# Patient Record
Sex: FEMALE | Race: WHITE | NOT HISPANIC OR LATINO | Employment: FULL TIME | URBAN - METROPOLITAN AREA
[De-identification: names, ages, dates, MRNs, and addresses within clinical notes are randomized per-mention and may not be internally consistent; named-entity substitution may affect disease eponyms.]

---

## 2017-06-21 ENCOUNTER — TRANSFERRED RECORDS (OUTPATIENT)
Dept: HEALTH INFORMATION MANAGEMENT | Facility: CLINIC | Age: 47
End: 2017-06-21

## 2017-08-10 ENCOUNTER — TRANSFERRED RECORDS (OUTPATIENT)
Dept: HEALTH INFORMATION MANAGEMENT | Facility: CLINIC | Age: 47
End: 2017-08-10

## 2017-08-17 ENCOUNTER — PRE VISIT (OUTPATIENT)
Dept: ORTHOPEDICS | Facility: CLINIC | Age: 47
End: 2017-08-17

## 2017-08-17 NOTE — TELEPHONE ENCOUNTER
1.  Date/reason for appt: 8/21/17 8AM - Left Knee Pain/Mass  2.  Referring provider: Dr. Kathy Celaya  3.  Call to patient (Yes / No - short description): no, pt is referred  4.  Previous care at / records requested from: [facility] -- Faxed STAT request for records/imaging

## 2017-08-18 NOTE — TELEPHONE ENCOUNTER
Radiology report received from [facility]. Waiting for image to be pushed.   MRI left knee 6/21/17- [facility] report

## 2017-08-18 NOTE — TELEPHONE ENCOUNTER
Spoke to [facility] -- they have a release on file for pt, they will push MRI to [facility].

## 2017-08-20 ASSESSMENT — ENCOUNTER SYMPTOMS
MUSCLE CRAMPS: 0
HALLUCINATIONS: 0
NECK PAIN: 0
WEIGHT GAIN: 0
STIFFNESS: 0
FEVER: 1
POLYPHAGIA: 0
INCREASED ENERGY: 1
BACK PAIN: 0
ALTERED TEMPERATURE REGULATION: 1
MYALGIAS: 1
CHILLS: 0
ARTHRALGIAS: 1
FATIGUE: 1
NIGHT SWEATS: 1
POLYDIPSIA: 0
DECREASED APPETITE: 0
WEIGHT LOSS: 0
MUSCLE WEAKNESS: 0
JOINT SWELLING: 1

## 2017-08-20 ASSESSMENT — KOOS JR
STRAIGHTENING KNEE FULLY: 1
KOOS JR SCORING: 63.78
RISING FROM SITTING: 2
HOW SEVERE IS YOUR KNEE STIFFNESS AFTER FIRST WAKING IN MORNING: 2
TWISING OR PIVOTING ON KNEE: 3
BENDING TO THE FLOOR TO PICK UP OBJECT: 2
GOING UP OR DOWN STAIRS: 4
STANDING UPRIGHT: 2

## 2017-08-21 ENCOUNTER — OFFICE VISIT (OUTPATIENT)
Dept: ORTHOPEDICS | Facility: CLINIC | Age: 47
End: 2017-08-21

## 2017-08-21 VITALS — BODY MASS INDEX: 33.64 KG/M2 | WEIGHT: 209.3 LBS | HEIGHT: 66 IN

## 2017-08-21 DIAGNOSIS — M25.562 PAIN IN BOTH KNEES, UNSPECIFIED CHRONICITY: ICD-10-CM

## 2017-08-21 DIAGNOSIS — M25.561 PAIN IN BOTH KNEES, UNSPECIFIED CHRONICITY: Primary | ICD-10-CM

## 2017-08-21 DIAGNOSIS — D16.9 ENCHONDROMA OF BONE: ICD-10-CM

## 2017-08-21 DIAGNOSIS — M25.561 PAIN IN BOTH KNEES, UNSPECIFIED CHRONICITY: ICD-10-CM

## 2017-08-21 DIAGNOSIS — M87.9 OSTEONECROSIS (H): ICD-10-CM

## 2017-08-21 DIAGNOSIS — M25.562 PAIN IN BOTH KNEES, UNSPECIFIED CHRONICITY: Primary | ICD-10-CM

## 2017-08-21 PROBLEM — E11.9 TYPE 2 DIABETES MELLITUS WITHOUT COMPLICATION (H): Status: ACTIVE | Noted: 2017-01-03

## 2017-08-21 PROBLEM — F41.9 ANXIETY: Status: ACTIVE | Noted: 2017-02-28

## 2017-08-21 PROBLEM — G43.009 MIGRAINE WITHOUT AURA AND WITHOUT STATUS MIGRAINOSUS, NOT INTRACTABLE: Status: ACTIVE | Noted: 2017-02-28

## 2017-08-21 PROBLEM — E66.9 OBESITY (BMI 30-39.9): Status: ACTIVE | Noted: 2017-07-07

## 2017-08-21 PROBLEM — E78.5 HLD (HYPERLIPIDEMIA): Status: ACTIVE | Noted: 2017-02-28

## 2017-08-21 LAB
CARDIOLIPIN ANTIBODY IGG: <1.6 GPL-U/ML (ref 0–19.9)
CARDIOLIPIN ANTIBODY IGM: 1.4 MPL-U/ML (ref 0–19.9)
FACT IX ACT/NOR PPP: 115 % (ref 65–150)
FACT VIII ACT/NOR PPP: 132 % (ref 55–200)
PLG ACT/NOR PPP CHRO: 120 % (ref 80–133)
PROT S FREE AG ACT/NOR PPP IA: 113 % (ref 55–125)

## 2017-08-21 RX ORDER — SUMATRIPTAN 50 MG/1
TABLET, FILM COATED ORAL
COMMUNITY

## 2017-08-21 RX ORDER — TRAMADOL HYDROCHLORIDE 50 MG/1
TABLET ORAL
COMMUNITY

## 2017-08-21 RX ORDER — CYCLOBENZAPRINE HCL 10 MG
TABLET ORAL
COMMUNITY

## 2017-08-21 RX ORDER — HYDROXYZINE HYDROCHLORIDE 25 MG/1
TABLET, FILM COATED ORAL
COMMUNITY

## 2017-08-21 RX ORDER — PROMETHAZINE HYDROCHLORIDE 25 MG/1
TABLET ORAL
COMMUNITY

## 2017-08-21 RX ORDER — ROSUVASTATIN CALCIUM 20 MG/1
TABLET, COATED ORAL
COMMUNITY
Start: 2017-01-03

## 2017-08-21 NOTE — PROGRESS NOTES
[facility], Orthopaedic Oncology Surgery Consultation  by Rodger Reinoso M.D.    Elda Zuleta MRN# [id number]   Age: 46 year old YOB: 1970     Requesting physician: Kathy Celaya MD [facility], sports med non-operative  Bertha Thomas MD PCP          Assessment and Plan:   Assessment:  1. 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 enchondromas. No risk factors of steroid exposure, alcohol use, thrombophilicdisease or prior trauma. Left knee symptoms and pain are unlikely related to her metaphyseal infarct. Symptoms were likely related to her early patellofemoral arthritis.  2. Right knee patellofemoral arthrosis, early, with subchondral marrow edema patella  3. Body mass index is 34.3 kg/(m^2).       Plan:  1. Advised evaluation for risk factors for osteonecrosis. Blood workup for thrombophilia or hypo-fibrinolysis will be arranged. Screening MRI to look at her hip joints will be obtained as well. I explained to the patient that osteonecrosis is only problematically involves the joint surfaces. Metaphyseal infarcts usually do not cause any permanent sequelae. No surgical or medical intervention is necessary. I've advised a follow-up MRI and x-ray be obtained in one years time.  2. Also advised whole body bone scan.  We discussed the diagnosis of enchondromas and natural history. No surgical intervention is warranted unless there is change over time and the appearance of the lesion.  3. I will ask our nurse Elsie rosales RN, to communicate findings of her laboratory workup.            History of Present Illness:   46 year old female  chief complaint     46-year-old female has symptoms of left knee pain which have been present for several months.  any become soft her and obtained x-rays and MRI examination. Never Harry in the left distal femur was identified. Concern was for osteonecrosis or an enchondroma. Patient has no prior history of any bone tumors.  "She denies any history of alcohol usage or chemical dependency. Furthermoreto exposure other than 1-2 episodes of Medrol Dosepak many years ago. No history of trauma to the left femur and no history of blood coagulation disorder or environmental working and underground or abnormal barometric pressure environment.               Physical Exam:     EXAMINATION pertinent findings:   VITAL SIGNS: Height 1.664 m (5' 5.5\"), weight 94.9 kg (209 lb 4.8 oz).  Body mass index is 34.3 kg/(m^2).  RESP: non labored breathing   ABD: benign   SKIN: grossly normal   LYMPHATIC: grossly normal   NEURO: grossly normal   VASCULAR: satisfactory perfusion of all extremities   MUSCULOSKELETAL:   Gait: Normal without limp. Hips bilaterally have symmetric full range of motion. Knee examination bilaterally reveals no evidence of effusion. Stable ligaments. No laxity. 0-140  range of motion. No patellofemoral crepitance in either knee joint.Asymmetric quadriceps atrophy noted.                     Data:   All laboratory data reviewed  All imaging studies reviewed by me      X-rays of both entire femoral are obtained. Bilateral distal femoral metaphyseal infarcts are noted. The appearance on her MR and of the left knee  is more suggestive of osteonecrosis as opposed to an enchondroma. However her radiographs do have findings which are suggestive of an enchondroma. No involvement of the subchondral bone is appreciated.    Rodger Reinoso MD  [facility] Family Professor  Oncology and Adult Reconstructive Surgery  Dept Orthopaedic Surgery, [facility]  [phone] office, [phone] pager  [email or web address]            DATA for DOCUMENTATION:         Past Medical History:     Patient Active Problem List   Diagnosis     Anxiety     HLD (hyperlipidemia)     Migraine without aura and without status migrainosus, not intractable     Obesity (BMI 30-39.9)     Type 2 diabetes mellitus without complication (H)     Past Medical History:   Diagnosis " Date     Diabetes (H) 2015     Hyperlipidemia 2015       Also see scanned health assessment forms.       Past Surgical History:   No past surgical history on file.         Social History:     Social History     Social History     Marital status:      Spouse name: N/A     Number of children: N/A     Years of education: N/A     Occupational History     Not on file.     Social History Main Topics     Smoking status: Current Every Day Smoker     Packs/day: 0.50     Years: 30.00     Types: Cigarettes     Start date: 8/1/1988     Smokeless tobacco: Not on file     Alcohol use No     Drug use: No     Sexual activity: Yes     Partners: Male     Birth control/ protection: Female Surgical     Other Topics Concern     Not on file     Social History Narrative     No narrative on file            Family History:       Family History   Problem Relation Age of Onset     DIABETES Father      Hypertension Father      Alcoholism Father      DIABETES Paternal Grandmother      DIABETES Paternal Grandfather      Hypertension Mother      Obesity Mother      Migraines Mother      Hypertension Maternal Grandmother      Hypertension Maternal Grandfather      Cardiac Sudden Death Maternal Grandfather             Medications:     Current Outpatient Prescriptions   Medication Sig     aspirin 81 MG EC tablet      cyclobenzaprine (FLEXERIL) 10 MG tablet      hydrOXYzine (ATARAX) 25 MG tablet      metFORMIN (GLUCOPHAGE) 1000 MG tablet      rosuvastatin (CRESTOR) 20 MG tablet      promethazine (PHENERGAN) 25 MG tablet      SUMAtriptan (IMITREX) 50 MG tablet      traMADol (ULTRAM) 50 MG tablet      blood glucose monitoring (NO BRAND SPECIFIED) test strip Dispense item covered by pt ins. E11.9 NIDDM type II - Test 1 time/day     No current facility-administered medications for this visit.               Review of Systems:   A comprehensive 10 point review of systems (constitutional, ENT, cardiac, peripheral vascular, lymphatic, respiratory, GI,  , Musculoskeletal, skin, Neurological) was performed and found to be negative except as described in this note.     See intake form completed by patient      Answers for HPI/ROS submitted by the patient on 8/20/2017   General Symptoms: Yes  Skin Symptoms: No  HENT Symptoms: No  EYE SYMPTOMS: No  HEART SYMPTOMS: No  LUNG SYMPTOMS: No  INTESTINAL SYMPTOMS: No  URINARY SYMPTOMS: No  GYNECOLOGIC SYMPTOMS: No  BREAST SYMPTOMS: No  SKELETAL SYMPTOMS: Yes  BLOOD SYMPTOMS: No  NERVOUS SYSTEM SYMPTOMS: No  MENTAL HEALTH SYMPTOMS: No  Fever: Yes  Loss of appetite: No  Weight loss: No  Weight gain: No  Fatigue: Yes  Night sweats: Yes  Chills: No  Increased stress: No  Excessive hunger: No  Excessive thirst: No  Feeling hot or cold when others believe the temperature is normal: Yes  Loss of height: No  Post-operative complications: No  Surgical site pain: No  Hallucinations: No  Change in or Loss of Energy: Yes  Hyperactivity: No  Confusion: No  Back pain: No  Muscle aches: Yes  Neck pain: No  Swollen joints: Yes  Joint pain: Yes  Bone pain: Yes  Muscle cramps: No  Muscle weakness: No  Joint stiffness: No  Bone fracture: No

## 2017-08-21 NOTE — LETTER
8/21/2017       RE: Elda Zuleta  [address] [place] [address]     Dear Colleague,    Thank you for referring your patient, Elda Zuleta, to the [facility] at [facility]. Please see a copy of my visit note below.    [facility], Orthopaedic Oncology Surgery Consultation  by Rodger Reinoso M.D.    Elda Zuleta MRN# [id number]   Age: 46 year old YOB: 1970     Requesting physician: Kathy Celaya MD [facility], sports med non-operative  Bertha Thomas MD PCP          Assessment and Plan:   Assessment:  1. 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 enchondromas. No risk factors of steroid exposure, alcohol use, thrombophilicdisease or prior trauma. Left knee symptoms and pain are unlikely related to her metaphyseal infarct. Symptoms were likely related to her early patellofemoral arthritis.  2. Right knee patellofemoral arthrosis, early, with subchondral marrow edema patella  3. Body mass index is 34.3 kg/(m^2).    Plan:  1. Advised evaluation for risk factors for osteonecrosis. Blood workup for thrombophilia or hypo-fibrinolysis will be arranged. Screening MRI to look at her hip joints will be obtained as well. I explained to the patient that osteonecrosis is only problematically involves the joint surfaces. Metaphyseal infarcts usually do not cause any permanent sequelae. No surgical or medical intervention is necessary. I've advised a follow-up MRI and x-ray be obtained in one years time.  2. Also advised whole body bone scan.  We discussed the diagnosis of enchondromas and natural history. No surgical intervention is warranted unless there is change over time and the appearance of the lesion.  3. I will ask our nurse Elsie rosales RN, to communicate findings of her laboratory workup.          History of Present Illness:   46 year old female  chief complaint     46-year-old female has symptoms  "of left knee pain which have been present for several months. Dr. herrmann become soft her and obtained x-rays and MRI examination. Never Harry in the left distal femur was identified. Concern was for osteonecrosis or an enchondroma. Patient has no prior history of any bone tumors. She denies any history of alcohol usage or chemical dependency. Furthermoreto exposure other than 1-2 episodes of Medrol Dosepak many years ago. No history of trauma to the left femur and no history of blood coagulation disorder or environmental working and underground or abnormal barometric pressure environment.         Physical Exam:     EXAMINATION pertinent findings:   VITAL SIGNS: Height 1.664 m (5' 5.5\"), weight 94.9 kg (209 lb 4.8 oz).  Body mass index is 34.3 kg/(m^2).  RESP: non labored breathing   ABD: benign   SKIN: grossly normal   LYMPHATIC: grossly normal   NEURO: grossly normal   VASCULAR: satisfactory perfusion of all extremities   MUSCULOSKELETAL:   Gait: Normal without limp. Hips bilaterally have symmetric full range of motion. Knee examination bilaterally reveals no evidence of effusion. Stable ligaments. No laxity. 0-140  range of motion. No patellofemoral crepitance in either knee joint.Asymmetric quadriceps atrophy noted.         Data:   All laboratory data reviewed  All imaging studies reviewed by me    X-rays of both entire femoral are obtained. Bilateral distal femoral metaphyseal infarcts are noted. The appearance on her MR and of the left knee  is more suggestive of osteonecrosis as opposed to an enchondroma. However her radiographs do have findings which are suggestive of an enchondroma. No involvement of the subchondral bone is appreciated.  MD Marylin Velez Family Professor  Oncology and Adult Reconstructive Surgery  Dept Orthopaedic Surgery, [facility]  [phone] office, [phone] pager  [email or web address]    DATA for DOCUMENTATION:       Past Medical History:     Patient Active Problem " List   Diagnosis     Anxiety     HLD (hyperlipidemia)     Migraine without aura and without status migrainosus, not intractable     Obesity (BMI 30-39.9)     Type 2 diabetes mellitus without complication (H)     Past Medical History:   Diagnosis Date     Diabetes (H) 2015     Hyperlipidemia 2015       Also see scanned health assessment forms.       Past Surgical History:   No past surgical history on file.         Social History:     Social History     Social History     Marital status:      Spouse name: N/A     Number of children: N/A     Years of education: N/A     Occupational History     Not on file.     Social History Main Topics     Smoking status: Current Every Day Smoker     Packs/day: 0.50     Years: 30.00     Types: Cigarettes     Start date: 8/1/1988     Smokeless tobacco: Not on file     Alcohol use No     Drug use: No     Sexual activity: Yes     Partners: Male     Birth control/ protection: Female Surgical     Other Topics Concern     Not on file     Social History Narrative     No narrative on file          Family History:       Family History   Problem Relation Age of Onset     DIABETES Father      Hypertension Father      Alcoholism Father      DIABETES Paternal Grandmother      DIABETES Paternal Grandfather      Hypertension Mother      Obesity Mother      Migraines Mother      Hypertension Maternal Grandmother      Hypertension Maternal Grandfather      Cardiac Sudden Death Maternal Grandfather             Medications:     Current Outpatient Prescriptions   Medication Sig     aspirin 81 MG EC tablet      cyclobenzaprine (FLEXERIL) 10 MG tablet      hydrOXYzine (ATARAX) 25 MG tablet      metFORMIN (GLUCOPHAGE) 1000 MG tablet      rosuvastatin (CRESTOR) 20 MG tablet      promethazine (PHENERGAN) 25 MG tablet      SUMAtriptan (IMITREX) 50 MG tablet      traMADol (ULTRAM) 50 MG tablet      blood glucose monitoring (NO BRAND SPECIFIED) test strip Dispense item covered by pt ins. E11.9 NIDDM type  II - Test 1 time/day     No current facility-administered medications for this visit.             Review of Systems:   A comprehensive 10 point review of systems (constitutional, ENT, cardiac, peripheral vascular, lymphatic, respiratory, GI, , Musculoskeletal, skin, Neurological) was performed and found to be negative except as described in this note.     See intake form completed by patient    Again, thank you for allowing me to participate in the care of your patient.      Sincerely,    Rodger Reinoso MD

## 2017-08-21 NOTE — MR AVS SNAPSHOT
After Visit Summary   8/21/2017    Elda Zuleta    MRN: [id number]           Patient Information     Date Of Birth          1970        Visit Information        Provider Department      8/21/2017 8:00 AM Rodger Reinoso MD [facility]        Today's Diagnoses     Pain in both knees, unspecified chronicity    -  1    Osteonecrosis (H)        Enchondroma of bone           Follow-ups after your visit        Your next 10 appointments already scheduled     Sep 05, 2017 12:00 PM CDT   NM INJECTION with UUNMINJ1   [facility], Nuclear Medicine ([facility])    [address] [place] [address]   [phone]            Sep 05, 2017  1:00 PM CDT   MR LOWER EXTREMITY JOINT LEFT W/O CONTRAST with UUMR2   [facility], MRI ([facility])    [address] [facility] [address]   [phone]           Take your medicines as usual, unless your doctor tells you not to. Bring a list of your current medicines to your exam (including vitamins, minerals and over-the-counter drugs). Also bring the results of similar scans you may have had.  Please remove any body piercings and hair extensions before you arrive.  Follow your doctor s orders. If you do not, we may have to postpone your exam.  You will not have contrast for this exam. You do not need to do anything special to prepare.  The MRI machine uses a strong magnet. Please wear clothes without metal (snaps, zippers). A sweatsuit works well, or we may give you a hospital gown.   **IMPORTANT** THE INSTRUCTIONS BELOW ARE ONLY FOR THOSE PATIENTS WHO HAVE BEEN TOLD THEY WILL RECEIVE SEDATION OR GENERAL ANESTHESIA DURING THEIR MRI PROCEDURE:  IF YOU WILL RECEIVE SEDATION (take medicine to help you relax during your exam):   You must get the medicine from your doctor before you arrive. Bring the medicine to the exam. Do not  take it at home.   Arrive one hour early. Bring someone who can take you home after the test. Your medicine will make you sleepy. After the exam, you may not drive, take a bus or take a taxi by yourself.   No eating 8 hours before your exam. You may have clear liquids up until 4 hours before your exam. (Clear liquids include water, clear tea, black coffee and fruit juice without pulp.)  IF YOU WILL RECEIVE ANESTHESIA (be asleep for your exam):   Arrive 1 1/2 hours early. Bring someone who can take you home after the test. You may not drive, take a bus or take a taxi by yourself.   No eating 8 hours before your exam. You may have clear liquids up until 4 hours before your exam. (Clear liquids include water, clear tea, black coffee and fruit juice without pulp.)   You will spend four to five hours in the recovery room.  Please call the Imaging Department at your exam site with any questions.            Sep 05, 2017  2:00 PM CDT   MR LOWER EXTREMITY JOINT RIGHT W/O CONTRAST with UUMR2   [facility], [place], [facility] ([facility], [facility])    [address] [facility] [address] [phone]           Take your medicines as usual, unless your doctor tells you not to. Bring a list of your current medicines to your exam (including vitamins, minerals and over-the-counter drugs). Also bring the results of similar scans you may have had.  Please remove any body piercings and hair extensions before you arrive.  Follow your doctor s orders. If you do not, we may have to postpone your exam.  You will not have contrast for this exam. You do not need to do anything special to prepare.  The MRI machine uses a strong magnet. Please wear clothes without metal (snaps, zippers). A sweatsuit works well, or we may give you a hospital gown.   **IMPORTANT** THE INSTRUCTIONS BELOW ARE ONLY FOR THOSE PATIENTS WHO HAVE BEEN TOLD THEY WILL RECEIVE SEDATION OR GENERAL ANESTHESIA DURING THEIR MRI  PROCEDURE:  IF YOU WILL RECEIVE SEDATION (take medicine to help you relax during your exam):   You must get the medicine from your doctor before you arrive. Bring the medicine to the exam. Do not take it at home.   Arrive one hour early. Bring someone who can take you home after the test. Your medicine will make you sleepy. After the exam, you may not drive, take a bus or take a taxi by yourself.   No eating 8 hours before your exam. You may have clear liquids up until 4 hours before your exam. (Clear liquids include water, clear tea, black coffee and fruit juice without pulp.)  IF YOU WILL RECEIVE ANESTHESIA (be asleep for your exam):   Arrive 1 1/2 hours early. Bring someone who can take you home after the test. You may not drive, take a bus or take a taxi by yourself.   No eating 8 hours before your exam. You may have clear liquids up until 4 hours before your exam. (Clear liquids include water, clear tea, black coffee and fruit juice without pulp.)   You will spend four to five hours in the recovery room.  Please call the Imaging Department at your exam site with any questions.            Sep 05, 2017  3:00 PM CDT   NM BONE SCAN WHOLE BODY with UUNM3   [facility], [place], Nuclear Medicine ([facility], [place])    [address] [facility] [address] [phone]           Please bring a list of your medicines to the exam. (Include vitamins, minerals and over-the-counter drugs.) You should wear comfortable clothes. Leave your valuables at home. Please bring related prior results and films. Tell your doctor:   If you are breastfeeding or may be pregnant.   If you have had a barium test within the past few days. Barium may change the results of certain exams.   If you think you may need sedation (medicine to help you relax).  You may eat and drink as normal.  Drink plenty of fluids and empty bladder frequently between injection and scans.            Sep 14, 2017 11:30 AM  CDT   (Arrive by 11:15 AM)   RETURN KNEE with Rodger Reinoso MD   [facility] ([facility])    [address] [facility]  [address] [place] [id number] [phone]              Future tests that were ordered for you today     Open Future Orders        Priority Expected Expires Ordered    NM Bone whole body Routine  8/21/2018 8/21/2017    Cardiolipin Valery IgG and IgM Routine  9/20/2017 8/21/2017    F2 prothrombin 05093T Mut Anal Routine  9/20/2017 8/21/2017    Factor 5 leiden mutation analysis Routine  9/20/2017 8/21/2017    Factor 8 assay Routine  9/20/2017 8/21/2017    Factor 9 assay Routine  9/20/2017 8/21/2017    Homocysteine with interpretation Routine  9/20/2017 8/21/2017    Lupus Anticoagulant Panel with interpretation Routine  9/20/2017 8/21/2017    Protein C chromogenic Routine  9/20/2017 8/21/2017    Protein S Antigen Free Routine  9/20/2017 8/21/2017    Plasminogen activity Routine  9/20/2017 8/21/2017    MRI Lower extremity joint w/o contrast LT* Routine  8/21/2018 8/21/2017    MRI Lower extremity joint w/o contrast RT* Routine  8/21/2018 8/21/2017            Who to contact     Please call your clinic at [phone] to:    Ask questions about your health    Make or cancel appointments    Discuss your medicines    Learn about your test results    Speak to your doctor   If you have compliments or concerns about an experience at your clinic, or if you wish to file a complaint, please contact [facility] Patient Relations at [phone] or email us at [email or web address]         Additional Information About Your Visit        MyChart Information     [employer] gives you secure access to your electronic health record. If you see a primary care provider, you can also send messages to your care team and make appointments. If you have questions, please call your primary care clinic.  If you do not have a primary care provider, please  "call [phone] and they will assist you.      [employer] is an electronic gateway that provides easy, online access to your medical records. With [employer], you can request a clinic appointment, read your test results, renew a prescription or communicate with your care team.     To access your existing account, please contact your [facility] or call [phone] for assistance.        Care EveryWhere ID     This is your Care EveryWhere ID. This could be used by other organizations to access your [facility] medical records  [id number]        Your Vitals Were     Height BMI (Body Mass Index)                1.664 m (5' 5.5\") 34.3 kg/m2           Blood Pressure from Last 3 Encounters:   No data found for BP    Weight from Last 3 Encounters:   08/21/17 94.9 kg (209 lb 4.8 oz)               Primary Care Provider Office Phone # Fax #    Bertha Thomas [phone]       [place] [id number] [facility] [id number]        Equal Access to Services     UMER GOULD AH: Hadii aad ku hadasho Soomaali, waaxda luqadaha, qaybta kaalmada adeegyada, waxay idiin hayaan adeeg kharash la'rafita . So [facility] [phone].    ATENCIÓN: Si habla español, tiene a biggs disposición servicios gratuitos de asistencia lingüística. Llame al [phone].    We comply with applicable federal civil rights laws and [place] laws. We do not discriminate on the basis of race, color, national origin, age, disability sex, sexual orientation or gender identity.            Thank you!     Thank you for choosing [facility]  for your care. Our goal is always to provide you with excellent care. Hearing back from our patients is one way we can continue to improve our services. Please take a few minutes to complete the written survey that you may receive in the mail after your visit with us. Thank you!             Your Updated Medication List - Protect others around you: Learn how to safely use, store " and throw away your medicines at www.disposemymeds.org.          This list is accurate as of: 8/21/17  9:12 AM.  Always use your most recent med list.                   Brand Name Dispense Instructions for use Diagnosis    aspirin 81 MG EC tablet           blood glucose monitoring test strip    no brand specified     Dispense item covered by pt ins. E11.9 NIDDM type II - Test 1 time/day        cyclobenzaprine 10 MG tablet    FLEXERIL          hydrOXYzine 25 MG tablet    ATARAX          metFORMIN 1000 MG tablet    GLUCOPHAGE          promethazine 25 MG tablet    PHENERGAN          rosuvastatin 20 MG tablet    CRESTOR          SUMAtriptan 50 MG tablet    IMITREX          traMADol 50 MG tablet    ULTRAM

## 2017-08-22 LAB — COPATH REPORT: NORMAL

## 2017-08-23 LAB
HCYS SERPL-SCNC: 5.9 UMOL/L (ref 4–12)
LA PPP-IMP: NEGATIVE

## 2017-08-25 LAB — PROT C ACT/NOR PPP CHRO: 140 % (ref 70–170)

## 2017-09-05 ENCOUNTER — HOSPITAL ENCOUNTER (OUTPATIENT)
Dept: NUCLEAR MEDICINE | Facility: CLINIC | Age: 47
Setting detail: NUCLEAR MEDICINE
End: 2017-09-05
Attending: ORTHOPAEDIC SURGERY
Payer: COMMERCIAL

## 2017-09-05 ENCOUNTER — HOSPITAL ENCOUNTER (OUTPATIENT)
Dept: MRI IMAGING | Facility: CLINIC | Age: 47
End: 2017-09-05
Attending: ORTHOPAEDIC SURGERY
Payer: COMMERCIAL

## 2017-09-05 ENCOUNTER — HOSPITAL ENCOUNTER (OUTPATIENT)
Dept: MRI IMAGING | Facility: CLINIC | Age: 47
Discharge: HOME | End: 2017-09-05
Attending: ORTHOPAEDIC SURGERY | Admitting: ORTHOPAEDIC SURGERY
Payer: COMMERCIAL

## 2017-09-05 DIAGNOSIS — M87.9 OSTEONECROSIS (H): ICD-10-CM

## 2017-09-05 DIAGNOSIS — D16.9 ENCHONDROMA OF BONE: ICD-10-CM

## 2017-09-05 LAB — RADIOLOGIST FLAGS: NORMAL

## 2017-09-05 PROCEDURE — 78306 BONE IMAGING WHOLE BODY: CPT

## 2017-09-05 PROCEDURE — 34300033 ZZH RX 343: Performed by: ORTHOPAEDIC SURGERY

## 2017-09-05 PROCEDURE — A9503 TC99M MEDRONATE: HCPCS | Performed by: ORTHOPAEDIC SURGERY

## 2017-09-05 RX ORDER — TC 99M MEDRONATE 20 MG/10ML
20-30 INJECTION, POWDER, LYOPHILIZED, FOR SOLUTION INTRAVENOUS ONCE
Status: COMPLETED | OUTPATIENT
Start: 2017-09-05 | End: 2017-09-05

## 2017-09-05 RX ADMIN — TC 99M MEDRONATE 23.7 MCI.: 20 INJECTION, POWDER, LYOPHILIZED, FOR SOLUTION INTRAVENOUS at 12:27

## 2017-09-09 ASSESSMENT — ENCOUNTER SYMPTOMS
NECK PAIN: 0
POLYDIPSIA: 0
ALTERED TEMPERATURE REGULATION: 1
CHILLS: 0
INCREASED ENERGY: 0
WEIGHT GAIN: 0
FATIGUE: 1
HALLUCINATIONS: 0
MYALGIAS: 0
JOINT SWELLING: 1
NIGHT SWEATS: 1
POLYPHAGIA: 0
STIFFNESS: 0
WEIGHT LOSS: 0
ARTHRALGIAS: 1
FEVER: 1
MUSCLE CRAMPS: 0
DECREASED APPETITE: 0
BACK PAIN: 0
MUSCLE WEAKNESS: 0

## 2017-09-14 ENCOUNTER — OFFICE VISIT (OUTPATIENT)
Dept: ORTHOPEDICS | Facility: CLINIC | Age: 47
End: 2017-09-14

## 2017-09-14 VITALS — BODY MASS INDEX: 33.51 KG/M2 | WEIGHT: 208.5 LBS | HEIGHT: 66 IN

## 2017-09-14 DIAGNOSIS — M87.9 OSTEONECROSIS (H): Primary | ICD-10-CM

## 2017-09-14 NOTE — LETTER
9/14/2017       RE: Kavon Jaramillo  [address] [place] [address]     Dear Colleague,    Thank you for referring your patient, Kavon Jaramillo, to the [facility] at [facility]. Please see a copy of my visit note below.        [facility], Orthopaedic Oncology Surgery Consultation  by Rodger Reinoso M.D.    Kavon Jaramillo MRN# [id number]   Age: 46 year old YOB: 1970     Requesting physician: Kathy Celaya MD [facility], sports med non-operative  Bertha Thomas MD PCP     Returns for review of her laboratory investigation studies for thrombophilia and hypothyroid lysis as well as her bilateral hip MRI studies    Results for KAVON JARAMILLO (MRN [id number]) as of 9/14/2017 13:21   Ref. Range 6/21/2017 00:00 8/21/2017 09:04 8/21/2017 09:52   Homocysteine umol/L Latest Ref Range: 4.0 - 12.0 umol/L   5.9   FACTOR 5 LEIDEN MUTATION ANALYSIS Unknown   Rpt   Factor 8 Assay Latest Ref Range: 55 - 200 %   132   Factor 9 Assay Latest Ref Range: 65 - 150 %   115   Lupus Result Latest Ref Range: NEG^Negative    Negative   Plasminogen Chromogenic Latest Ref Range: 80 - 133 %   120   Prot C Chromogenic Latest Ref Range: 70 - 170 %   140   Protein S Free Latest Ref Range: 55 - 125 %   113   Cardiolipin Antibody IgG Latest Ref Range: 0.0 - 19.9 GPL-U/mL   <1.6   Cardiolipin Antibody IgM Latest Ref Range: 0.0 - 19.9 MPL-U/mL   1.4   FACTOR 2 AND 5 MUTATION ANALYSIS Unknown   Rpt   FACTOR 2 PROTHROMBIN 85065E MUT ANAL Unknown   Rpt              Assessment and Plan:   Assessment:  1. 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    No risk factors of steroid exposure, alcohol use.  Thrombophilia/hypofibrinolysis w/u is negative. No prior trauma. Left knee symptoms and pain are unlikely related to her metaphyseal infarct and more likely related to her early  patellofemoral arthritis.  2. Right knee patellofemoral arthrosis, early, with subchondral marrow edema patella  Body mass index is 34.17 kg/(m^2).      Plan:  1. I explained the osteonecrosis, natural history and the idiopathic etiology in her situation. I reviewed the MRI findings with her. She is a very small infarct involving the right femoral head and the risk of femoral head collapse is very small.   I do not see an indication for treatment intervention with any kind of surgery or medication. I do not think the osteonecrosis is responsible for the etiology for her pain. 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 I advised expectant management and use of anti-inflammatory agents over-the-counter or aspirin for her early arthritis. If her symptoms are progressive over the course of the next year, a follow-up MRI of both distal femurs and the right femoral head in 1 year's time would be reasonable.  2. Return to check p.r.n.      Rodger Reinoso MD  [facility] Professor  Oncology and Adult Reconstructive Surgery  Dept Orthopaedic Surgery, [facility]  [phone] office, [phone] pager  [email or web address]      Total Time = 15 min, 50% of which was spent in counseling and coordination of care as documented above.        Again, thank you for allowing me to participate in the care of your patient.      Sincerely,    Rodger Reinoso MD

## 2017-09-14 NOTE — NURSING NOTE
"Chief Complaint   Patient presents with     Results     F/U Left Knee Pain due to a        47 year old  1970    Ht 1.664 m (5' 5.5\")  Wt 94.6 kg (208 lb 8 oz)  BMI 34.17 kg/m2               [id number] - [place], [place] - [address] AT [facility] [address]    Allergies   Allergen Reactions     Coconut Oil Anaphylaxis     Current Outpatient Prescriptions   Medication     aspirin 81 MG EC tablet     cyclobenzaprine (FLEXERIL) 10 MG tablet     hydrOXYzine (ATARAX) 25 MG tablet     metFORMIN (GLUCOPHAGE) 1000 MG tablet     rosuvastatin (CRESTOR) 20 MG tablet     promethazine (PHENERGAN) 25 MG tablet     SUMAtriptan (IMITREX) 50 MG tablet     traMADol (ULTRAM) 50 MG tablet     blood glucose monitoring (NO BRAND SPECIFIED) test strip     No current facility-administered medications for this visit.            Promis 10 Assessment    PROMIS 10 8/20/2017   In general, would you say your health is: Good   In general, would you say your quality of life is: Good   In general, how would you rate your physical health? Good   In general, how would you rate your mental health, including your mood and your ability to think? Good   In general, how would you rate your satisfaction with your social activities and relationships? Good   In general, please rate how well you carry out your usual social activities and roles Good   To what extent are you able to carry out your everyday physical activities such as walking, climbing stairs, carrying groceries, or moving a chair? A little   How often have you been bothered by emotional problems such as feeling anxious, depressed or irritable? Rarely   How would you rate your fatigue on average? Moderate   How would you rate your pain on average?   0 = No Pain  to  10 = Worst Imaginable Pain 6   Global Physical Health Score : Raw Score 11 (SUM : G03 - G06 - G07 - G08)   Global Mentall Health Score : Raw Score 13 (SUM : G02 - G04 - G05 - G10)   Total " (Physical + Mental Health Score) 24   In general, would you say your health is: 3   In general, would you say your quality of life is: 3   In general, how would you rate your physical health? 3   In general, how would you rate your mental health, including your mood and your ability to think? 3   In general, how would you rate your satisfaction with your social activities and relationships? 3   In general, please rate how well you carry out your usual social activities and roles. (This includes activities at home, at work and in your community, and responsibilities as a parent, child, spouse, employee, friend, etc.) 3   To what extent are you able to carry out your everyday physical activities such as walking, climbing stairs, carrying groceries, or moving a chair? 2   In the past 7 days, how often have you been bothered by emotional problems such as feeling anxious, depressed, or irritable? 4   In the past 7 days, how would you rate your fatigue on average? 3   In the past 7 days, how would you rate your pain on average, where 0 means no pain, and 10 means worst imaginable pain? 6   Global Mental Health Score 13   Global Physical Health Score 11   PROMIS TOTAL - SUBSCORES 24   Pain question re-calculation - no clinical value 3                Gisella Busch C.M.A.

## 2017-09-14 NOTE — PROGRESS NOTES
[facility], Orthopaedic Oncology Surgery Consultation  by Rodger Reinoso M.D.    Kavon Jaramillo MRN# [id number]   Age: 46 year old YOB: 1970     Requesting physician: Kathy Celaya MD [facility], sports med non-operative  Bertha Thomas MD PCP     Returns for review of her laboratory investigation studies for thrombophilia and hypothyroid lysis as well as her bilateral hip MRI studies    Results for KAVON JARAMILLO (MRN [id number]) as of 9/14/2017 13:21   Ref. Range 6/21/2017 00:00 8/21/2017 09:04 8/21/2017 09:52   Homocysteine umol/L Latest Ref Range: 4.0 - 12.0 umol/L   5.9   FACTOR 5 LEIDEN MUTATION ANALYSIS Unknown   Rpt   Factor 8 Assay Latest Ref Range: 55 - 200 %   132   Factor 9 Assay Latest Ref Range: 65 - 150 %   115   Lupus Result Latest Ref Range: NEG^Negative    Negative   Plasminogen Chromogenic Latest Ref Range: 80 - 133 %   120   Prot C Chromogenic Latest Ref Range: 70 - 170 %   140   Protein S Free Latest Ref Range: 55 - 125 %   113   Cardiolipin Antibody IgG Latest Ref Range: 0.0 - 19.9 GPL-U/mL   <1.6   Cardiolipin Antibody IgM Latest Ref Range: 0.0 - 19.9 MPL-U/mL   1.4   FACTOR 2 AND 5 MUTATION ANALYSIS Unknown   Rpt   FACTOR 2 PROTHROMBIN [id number] MUT ANAL Unknown   Rpt              Assessment and Plan:   Assessment:  1. 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    No risk factors of steroid exposure, alcohol use.  Thrombophilia/hypofibrinolysis w/u is negative. No prior trauma. Left knee symptoms and pain are unlikely related to her metaphyseal infarct and more likely related to her early patellofemoral arthritis.  2. Right knee patellofemoral arthrosis, early, with subchondral marrow edema patella  Body mass index is 34.17 kg/(m^2).      Plan:  1. I explained the osteonecrosis, natural history and the idiopathic etiology in her situation. I reviewed the MRI findings with her.  She is a very small infarct involving the right femoral head and the risk of femoral head collapse is very small.   I do not see an indication for treatment intervention with any kind of surgery or medication. I do not think the osteonecrosis is responsible for the etiology for her pain. 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 I advised expectant management and use of anti-inflammatory agents over-the-counter or aspirin for her early arthritis. If her symptoms are progressive over the course of the next year, a follow-up MRI of both distal femurs and the right femoral head in 1 year's time would be reasonable.  2. Return to check p.r.n.      Rodger Reinoso MD  [facility] Professor  Oncology and Adult Reconstructive Surgery  Dept Orthopaedic Surgery, [facility]  [phone] office, [phone] pager  [email or web address]      Total Time = 15 min, 50% of which was spent in counseling and coordination of care as documented above.      Answers for HPI/ROS submitted by the patient on 9/9/2017   General Symptoms: Yes  Skin Symptoms: No  HENT Symptoms: No  EYE SYMPTOMS: No  HEART SYMPTOMS: No  LUNG SYMPTOMS: No  INTESTINAL SYMPTOMS: No  URINARY SYMPTOMS: No  GYNECOLOGIC SYMPTOMS: No  BREAST SYMPTOMS: No  SKELETAL SYMPTOMS: Yes  BLOOD SYMPTOMS: No  NERVOUS SYSTEM SYMPTOMS: No  MENTAL HEALTH SYMPTOMS: No  Fever: Yes  Loss of appetite: No  Weight loss: No  Weight gain: No  Fatigue: Yes  Night sweats: Yes  Chills: No  Increased stress: No  Excessive hunger: No  Excessive thirst: No  Feeling hot or cold when others believe the temperature is normal: Yes  Loss of height: No  Post-operative complications: No  Surgical site pain: No  Hallucinations: No  Change in or Loss of Energy: No  Hyperactivity: No  Confusion: No  Back pain: No  Muscle aches: No  Neck pain: No  Swollen joints: Yes  Joint pain: Yes  Bone pain: Yes  Muscle cramps: No  Muscle weakness: No  Joint  stiffness: No  Bone fracture: No

## 2017-09-14 NOTE — MR AVS SNAPSHOT
"              After Visit Summary   9/14/2017    Elda Zuleta    MRN: [id number]           Patient Information     Date Of Birth          1970        Visit Information        Provider Department      9/14/2017 11:30 AM Rodger Reinoso MD [facility]        Today's Diagnoses     Osteonecrosis (H)    -  1       Follow-ups after your visit        Who to contact     Please call your clinic at [phone] to:    Ask questions about your health    Make or cancel appointments    Discuss your medicines    Learn about your test results    Speak to your doctor   If you have compliments or concerns about an experience at your clinic, or if you wish to file a complaint, please contact [facility] Patient Relations at [phone] or email us at [email or web address]         Additional Information About Your Visit        MyChart Information     [employer] gives you secure access to your electronic health record. If you see a primary care provider, you can also send messages to your care team and make appointments. If you have questions, please call your primary care clinic.  If you do not have a primary care provider, please call [phone] and they will assist you.      [employer] is an electronic gateway that provides easy, online access to your medical records. With [employer], you can request a clinic appointment, read your test results, renew a prescription or communicate with your care team.     To access your existing account, please contact your [facility] or call [phone] for assistance.        Care EveryWhere ID     This is your Care EveryWhere ID. This could be used by other organizations to access your [place] medical records  [id number]        Your Vitals Were     Height BMI (Body Mass Index)                1.664 m (5' 5.5\") 34.17 kg/m2           Blood Pressure from Last 3 Encounters:   No data found for BP    Weight from " Last 3 Encounters:   09/14/17 94.6 kg (208 lb 8 oz)   08/21/17 94.9 kg (209 lb 4.8 oz)              Today, you had the following     No orders found for display       Primary Care Provider Office Phone # Fax #    Bertha Thomas [phone]       [facility] [address]  [facility] [place] [address]        Equal Access to Services     [facility] : Hadii aad ku hadasho Soomaali, waaxda luqadaha, qaybta kaalmada adeegyada, waxay idiin hayaan adeeg kharash la'aan ah. So [facility] [phone].    ATENCIÓN: Si habla esppetar, tiene a biggs disposición servicios gratuitos de asistencia lingüística. Llame al [phone].    We comply with applicable federal civil rights laws and [place] laws. We do not discriminate on the basis of race, color, national origin, age, disability sex, sexual orientation or gender identity.            Thank you!     Thank you for choosing [facility]  for your care. Our goal is always to provide you with excellent care. Hearing back from our patients is one way we can continue to improve our services. Please take a few minutes to complete the written survey that you may receive in the mail after your visit with us. Thank you!             Your Updated Medication List - Protect others around you: Learn how to safely use, store and throw away your medicines at www.disposemymeds.org.          This list is accurate as of: 9/14/17  1:32 PM.  Always use your most recent med list.                   Brand Name Dispense Instructions for use Diagnosis    aspirin 81 MG EC tablet           blood glucose monitoring test strip    no brand specified     Dispense item covered by pt ins. E11.9 NIDDM type II - Test 1 time/day        cyclobenzaprine 10 MG tablet    FLEXERIL          hydrOXYzine 25 MG tablet    ATARAX          metFORMIN 1000 MG tablet    GLUCOPHAGE          promethazine 25 MG tablet    PHENERGAN          rosuvastatin 20 MG tablet    CRESTOR          SUMAtriptan 50 MG tablet     IMITREX          traMADol 50 MG tablet    ULTRAM

## 2018-01-21 ENCOUNTER — HEALTH MAINTENANCE LETTER (OUTPATIENT)
Age: 48
End: 2018-01-21

## 2018-11-07 NOTE — TELEPHONE ENCOUNTER
LVM for pt -- need HÉCTOR for [facility] or to call [facility] to get a verbal ok to have them push imaging.   impaired balance/decreased strength/pain

## 2020-03-11 ENCOUNTER — HEALTH MAINTENANCE LETTER (OUTPATIENT)
Age: 50
End: 2020-03-11

## 2020-12-27 ENCOUNTER — HEALTH MAINTENANCE LETTER (OUTPATIENT)
Age: 50
End: 2020-12-27

## 2021-03-06 ENCOUNTER — HEALTH MAINTENANCE LETTER (OUTPATIENT)
Age: 51
End: 2021-03-06

## 2021-04-25 ENCOUNTER — HEALTH MAINTENANCE LETTER (OUTPATIENT)
Age: 51
End: 2021-04-25

## 2021-08-14 ENCOUNTER — HEALTH MAINTENANCE LETTER (OUTPATIENT)
Age: 51
End: 2021-08-14

## 2021-10-09 ENCOUNTER — HEALTH MAINTENANCE LETTER (OUTPATIENT)
Age: 51
End: 2021-10-09

## 2022-03-26 ENCOUNTER — HEALTH MAINTENANCE LETTER (OUTPATIENT)
Age: 52
End: 2022-03-26

## 2022-05-21 ENCOUNTER — HEALTH MAINTENANCE LETTER (OUTPATIENT)
Age: 52
End: 2022-05-21

## 2022-09-17 ENCOUNTER — HEALTH MAINTENANCE LETTER (OUTPATIENT)
Age: 52
End: 2022-09-17

## 2023-01-23 ENCOUNTER — HEALTH MAINTENANCE LETTER (OUTPATIENT)
Age: 53
End: 2023-01-23

## 2023-05-06 ENCOUNTER — HEALTH MAINTENANCE LETTER (OUTPATIENT)
Age: 53
End: 2023-05-06

## 2023-06-04 ENCOUNTER — HEALTH MAINTENANCE LETTER (OUTPATIENT)
Age: 53
End: 2023-06-04

## 2023-07-29 ENCOUNTER — HEALTH MAINTENANCE LETTER (OUTPATIENT)
Age: 53
End: 2023-07-29

## 2024-02-24 ENCOUNTER — HEALTH MAINTENANCE LETTER (OUTPATIENT)
Age: 54
End: 2024-02-24

## 2024-10-08 ENCOUNTER — APPOINTMENT (OUTPATIENT)
Dept: GENERAL RADIOLOGY | Facility: CLINIC | Age: 54
End: 2024-10-08
Attending: EMERGENCY MEDICINE
Payer: COMMERCIAL

## 2024-10-08 ENCOUNTER — HOSPITAL ENCOUNTER (EMERGENCY)
Facility: CLINIC | Age: 54
Discharge: HOME | End: 2024-10-09
Attending: EMERGENCY MEDICINE | Admitting: EMERGENCY MEDICINE
Payer: COMMERCIAL

## 2024-10-08 VITALS
OXYGEN SATURATION: 99 % | WEIGHT: 167.9 LBS | TEMPERATURE: 98.7 F | HEART RATE: 75 BPM | BODY MASS INDEX: 27.97 KG/M2 | SYSTOLIC BLOOD PRESSURE: 129 MMHG | HEIGHT: 65 IN | DIASTOLIC BLOOD PRESSURE: 75 MMHG | RESPIRATION RATE: 18 BRPM
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
## 2024-10-08 DIAGNOSIS — S40.012A CONTUSION OF LEFT SHOULDER, INITIAL ENCOUNTER: ICD-10-CM

## 2024-10-08 PROCEDURE — 73030 X-RAY EXAM OF SHOULDER: CPT | Mod: LT

## 2024-10-08 PROCEDURE — 73060 X-RAY EXAM OF HUMERUS: CPT | Mod: LT

## 2024-10-08 PROCEDURE — 250N000013 HC RX MED GY IP 250 OP 250 PS 637: Performed by: EMERGENCY MEDICINE

## 2024-10-08 PROCEDURE — 99284 EMERGENCY DEPT VISIT MOD MDM: CPT | Performed by: EMERGENCY MEDICINE

## 2024-10-08 PROCEDURE — 99283 EMERGENCY DEPT VISIT LOW MDM: CPT | Performed by: EMERGENCY MEDICINE

## 2024-10-08 RX ORDER — SEMAGLUTIDE 2.68 MG/ML
INJECTION, SOLUTION SUBCUTANEOUS
COMMUNITY

## 2024-10-08 RX ORDER — IBUPROFEN 600 MG/1
600 TABLET, FILM COATED ORAL ONCE
Status: COMPLETED | OUTPATIENT
Start: 2024-10-08 | End: 2024-10-08

## 2024-10-08 RX ORDER — SERTRALINE HYDROCHLORIDE 100 MG/1
TABLET, FILM COATED ORAL
COMMUNITY

## 2024-10-08 RX ORDER — ACETAMINOPHEN 500 MG
1000 TABLET ORAL ONCE
Status: COMPLETED | OUTPATIENT
Start: 2024-10-08 | End: 2024-10-08

## 2024-10-08 RX ADMIN — ACETAMINOPHEN 1000 MG: 500 TABLET ORAL at 22:52

## 2024-10-08 RX ADMIN — IBUPROFEN 600 MG: 600 TABLET, FILM COATED ORAL at 22:52

## 2024-10-08 ASSESSMENT — ACTIVITIES OF DAILY LIVING (ADL)
ADLS_ACUITY_SCORE: 35
ADLS_ACUITY_SCORE: 35

## 2024-10-08 ASSESSMENT — COLUMBIA-SUICIDE SEVERITY RATING SCALE - C-SSRS
1. IN THE PAST MONTH, HAVE YOU WISHED YOU WERE DEAD OR WISHED YOU COULD GO TO SLEEP AND NOT WAKE UP?: NO
6. HAVE YOU EVER DONE ANYTHING, STARTED TO DO ANYTHING, OR PREPARED TO DO ANYTHING TO END YOUR LIFE?: NO
2. HAVE YOU ACTUALLY HAD ANY THOUGHTS OF KILLING YOURSELF IN THE PAST MONTH?: NO

## 2024-10-09 NOTE — ED TRIAGE NOTES
Triage Assessment (Adult)       Row Name 10/08/24 5079          Triage Assessment    Airway WDL WDL        Respiratory WDL    Respiratory WDL WDL        Skin Circulation/Temperature WDL    Skin Circulation/Temperature WDL WDL        Cardiac WDL    Cardiac WDL WDL        Peripheral/Neurovascular WDL    Peripheral Neurovascular WDL WDL        Cognitive/Neuro/Behavioral WDL    Cognitive/Neuro/Behavioral WDL WDL
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
## 2024-11-30 ENCOUNTER — HEALTH MAINTENANCE LETTER (OUTPATIENT)
Age: 54
End: 2024-11-30

## 2025-06-07 ENCOUNTER — HEALTH MAINTENANCE LETTER (OUTPATIENT)
Age: 55
End: 2025-06-07